# Patient Record
Sex: MALE | Race: WHITE | URBAN - NONMETROPOLITAN AREA
[De-identification: names, ages, dates, MRNs, and addresses within clinical notes are randomized per-mention and may not be internally consistent; named-entity substitution may affect disease eponyms.]

---

## 2024-05-22 ENCOUNTER — OFFICE VISIT (OUTPATIENT)
Dept: UROLOGY | Facility: CLINIC | Age: 69
End: 2024-05-22
Payer: MEDICARE

## 2024-05-22 VITALS — BODY MASS INDEX: 26.41 KG/M2 | HEIGHT: 72 IN | WEIGHT: 195 LBS | RESPIRATION RATE: 16 BRPM

## 2024-05-22 DIAGNOSIS — R35.1 NOCTURIA: ICD-10-CM

## 2024-05-22 DIAGNOSIS — N40.1 BENIGN PROSTATIC HYPERPLASIA WITH LOWER URINARY TRACT SYMPTOMS, SYMPTOM DETAILS UNSPECIFIED: ICD-10-CM

## 2024-05-22 DIAGNOSIS — N52.9 ERECTILE DYSFUNCTION, UNSPECIFIED ERECTILE DYSFUNCTION TYPE: ICD-10-CM

## 2024-05-22 LAB
POC APPEARANCE, URINE: CLEAR
POC BILIRUBIN, URINE: NEGATIVE
POC BLOOD, URINE: NEGATIVE
POC COLOR, URINE: YELLOW
POC GLUCOSE, URINE: NEGATIVE MG/DL
POC KETONES, URINE: NEGATIVE MG/DL
POC LEUKOCYTES, URINE: NEGATIVE
POC NITRITE,URINE: NEGATIVE
POC PH, URINE: 6 PH
POC PROTEIN, URINE: NEGATIVE MG/DL
POC SPECIFIC GRAVITY, URINE: 1.02
POC UROBILINOGEN, URINE: 0.2 EU/DL

## 2024-05-22 PROCEDURE — 1036F TOBACCO NON-USER: CPT | Performed by: UROLOGY

## 2024-05-22 PROCEDURE — 81003 URINALYSIS AUTO W/O SCOPE: CPT | Performed by: UROLOGY

## 2024-05-22 PROCEDURE — 99204 OFFICE O/P NEW MOD 45 MIN: CPT | Performed by: UROLOGY

## 2024-05-22 RX ORDER — ASPIRIN 81 MG/1
81 TABLET ORAL 2 TIMES DAILY
COMMUNITY
Start: 2022-10-26

## 2024-05-22 RX ORDER — SILDENAFIL CITRATE 20 MG/1
20 TABLET ORAL
COMMUNITY
Start: 2024-04-18

## 2024-05-22 RX ORDER — MONTELUKAST SODIUM 10 MG/1
10 TABLET ORAL
COMMUNITY
Start: 2024-04-18

## 2024-05-22 RX ORDER — HYDROCHLOROTHIAZIDE 25 MG/1
25 TABLET ORAL
COMMUNITY
Start: 2024-04-18

## 2024-05-22 RX ORDER — POTASSIUM CHLORIDE 20 MEQ/1
20 TABLET, EXTENDED RELEASE ORAL 2 TIMES DAILY
COMMUNITY
Start: 2024-04-18

## 2024-05-22 RX ORDER — TAMSULOSIN HYDROCHLORIDE 0.4 MG/1
2 CAPSULE ORAL NIGHTLY
COMMUNITY
Start: 2024-04-18

## 2024-05-22 RX ORDER — ALFUZOSIN HYDROCHLORIDE 10 MG/1
10 TABLET, EXTENDED RELEASE ORAL DAILY
Qty: 30 TABLET | Refills: 11 | Status: SHIPPED | OUTPATIENT
Start: 2024-05-22 | End: 2025-05-22

## 2024-05-22 RX ORDER — LOSARTAN POTASSIUM AND HYDROCHLOROTHIAZIDE 25; 100 MG/1; MG/1
1 TABLET ORAL
COMMUNITY
Start: 2024-04-18

## 2024-05-22 RX ORDER — ATORVASTATIN CALCIUM 40 MG/1
1 TABLET, FILM COATED ORAL NIGHTLY
COMMUNITY
Start: 2024-04-18

## 2024-05-22 RX ORDER — FAMOTIDINE 20 MG/1
1 TABLET, FILM COATED ORAL AS NEEDED
COMMUNITY

## 2024-05-22 ASSESSMENT — ENCOUNTER SYMPTOMS
EYES NEGATIVE: 1
COUGH: 0
PSYCHIATRIC NEGATIVE: 1
ALLERGIC/IMMUNOLOGIC NEGATIVE: 1
SHORTNESS OF BREATH: 0
FEVER: 0
CHILLS: 0
NAUSEA: 0
DIFFICULTY URINATING: 0
ENDOCRINE NEGATIVE: 1

## 2024-05-22 NOTE — PROGRESS NOTES
Subjective   Patient ID: Wilfredo Fish is a 69 y.o. male.    HPI  Patient is here to establish for BPH. He states he has a split stream. He states when he sits down he does not have this issue but otherwise at night spraying and weak stream is an issue. . Denies hesitancy or weak stream.  Denies dysuria. Denies hematuria. Nocturia x2-3. He is taking Flomax. He has failed daily Cialis due to side effects. Most recent PSA was 1.18 on 4/24. No hx of kidney stones.  ED is chronic. Sildenafil PRN.  UA clear 5/24      Review of Systems   Constitutional:  Negative for chills and fever.   HENT: Negative.     Eyes: Negative.    Respiratory:  Negative for cough and shortness of breath.    Cardiovascular:  Negative for chest pain and leg swelling.   Gastrointestinal:  Negative for nausea.   Endocrine: Negative.    Genitourinary:  Negative for difficulty urinating.        Negative except for documented in HPI   Allergic/Immunologic: Negative.    Neurological:         Alert & oriented X 3   Hematological:         Denies blood thinners   Psychiatric/Behavioral: Negative.         Objective   Physical Exam  Vitals and nursing note reviewed.   Constitutional:       General: He is not in acute distress.     Appearance: Normal appearance.   Pulmonary:      Effort: Pulmonary effort is normal.   Abdominal:      Tenderness: There is no abdominal tenderness.   Genitourinary:     Comments: Kidneys non palpable bilaterally  Bladder non palpable or tender  Scrotum no mass, No hydrocele  Epididymis- No spermatocele. Non Tender.  Testicles: No mass. WNL  Urethra: No discharge. Mild hypospadias  Penis within normal limits... No lesions  Prostate - symmetric, no nodules. BENIGN  Seminal Vesicals: No mass.  Sphincter tone: normal  Neurological:      Mental Status: He is alert.         Assessment/Plan   Diagnoses and all orders for this visit:  Benign prostatic hyperplasia with lower urinary tract symptoms, symptom details  unspecified  Nocturia  -     POCT UA Automated manually resulted  Erectile dysfunction, unspecified erectile dysfunction type      All available PSA values reviewed, Options discussed. Questions answered.   Diet changes for prostate health discussed and educational information given. Pros/Cons of prostate health supplements discussed.   Treatment options for LUTS reviewed  Hold Flomax  Uroxatrol Rx given  Discussed DDAVP for Nocturia 2-3x  Cysto scheduled to evaluate for stricture  Prostate U/S ordered-Discussed UroLift  Discussed timed voiding. Discussed fluid and caffeine intake  Treatment options for ED reviewed.  Continue Sildenafil PRN  Lifestyle change to help prevent UTIs discussed. Encouraged fluid intake.    F/U  cysto and prostate U/S

## 2024-05-29 ENCOUNTER — APPOINTMENT (OUTPATIENT)
Dept: UROLOGY | Facility: CLINIC | Age: 69
End: 2024-05-29
Payer: MEDICARE

## 2024-06-06 NOTE — PROGRESS NOTES
Patient ID: Wilfredo Fish is a 69 y.o. male.    Procedures  The patient was prepped using a Betadine solution. Lidocaine jelly was instilled into the urethra. The flexible cystoscope was sterilely inserted into the urethra and formal cystoscopy performed in a systematic fashion. . For detailed findings of the procedure, please see Dr. Moreau remarks below   CIPRO 250MG POBID TIMES 3 DAYS GIVEN    The patient was placed on his side. Lidocaine jelly was injected into the anus. We inserted the ultrasound probe into the rectum. The prostate was identified and the ultrasound performed.. For details of the ultrasound findings, please see the remarks section of this note. The patient tolerated the procedure well. There were no complications. He was instructed on post procedure care.     PSA 1.18 (4/2024)    PROSTATE VOLUME-34CC    NO MASS. NO MEDIAN LOBE. MILD TRABECULATION    DISCUSSED UROLIFT.

## 2024-06-19 ENCOUNTER — APPOINTMENT (OUTPATIENT)
Dept: UROLOGY | Facility: CLINIC | Age: 69
End: 2024-06-19
Payer: MEDICARE

## 2024-06-19 DIAGNOSIS — N40.1 BENIGN PROSTATIC HYPERPLASIA WITH LOWER URINARY TRACT SYMPTOMS, SYMPTOM DETAILS UNSPECIFIED: ICD-10-CM

## 2024-06-19 DIAGNOSIS — R35.1 NOCTURIA: ICD-10-CM

## 2024-09-13 ASSESSMENT — ENCOUNTER SYMPTOMS
ALLERGIC/IMMUNOLOGIC NEGATIVE: 1
NAUSEA: 0
ENDOCRINE NEGATIVE: 1
COUGH: 0
PSYCHIATRIC NEGATIVE: 1
EYES NEGATIVE: 1
FEVER: 0
DIFFICULTY URINATING: 0
CHILLS: 0
SHORTNESS OF BREATH: 0

## 2024-09-13 NOTE — PROGRESS NOTES
Subjective   Patient ID: Wilfredo Fish is a 69 y.o. male.    HPI  Patient is here for 3 month follow up. Chronic BPH sx are mild and stable. Denies urgency and frequency. Denies dysuria. Denies hematuria. Nocturia x1. He was taking Flomax but stopped. He has failed Daily Cialis. Cysto and Prostate US done on 6/24 and showed that he was a UroLift candidate. PSA 4/24 was 1.18. UA was clear 5/24.      Review of Systems   Constitutional:  Negative for chills and fever.   HENT: Negative.     Eyes: Negative.    Respiratory:  Negative for cough and shortness of breath.    Cardiovascular:  Negative for chest pain and leg swelling.   Gastrointestinal:  Negative for nausea.   Endocrine: Negative.    Genitourinary:  Negative for difficulty urinating.        Negative except for documented in HPI   Allergic/Immunologic: Negative.    Neurological:         Alert & oriented X 3   Hematological:         Denies blood thinners   Psychiatric/Behavioral: Negative.         Objective   Physical Exam  Vitals and nursing note reviewed.   Pulmonary:      Effort: Pulmonary effort is normal.   Abdominal:      Palpations: Abdomen is soft.      Tenderness: There is no abdominal tenderness.   Genitourinary:     Comments: Kidneys non palpable bilaterally  Bladder non palpable or tender  Neurological:      Mental Status: He is alert.         Assessment/Plan   Diagnoses and all orders for this visit:  Erectile dysfunction, unspecified erectile dysfunction type  Nocturia  Benign prostatic hyperplasia with lower urinary tract symptoms, symptom details unspecified    All available PSA values reviewed, Options discussed. Questions answered.   Diet changes for prostate health discussed and educational information given. Pros/Cons of prostate health supplements discussed.   Treatment options for LUTS reviewed  Pros/cons of UroLift discussed  Discussed timed voiding. Discussed fluid and caffeine intake  Treatment options for ED reviewed.  Lifestyle  change to help prevent UTIs discussed. Encouraged fluid intake.    F/U 4/25 with PSA

## 2024-09-18 ENCOUNTER — APPOINTMENT (OUTPATIENT)
Dept: UROLOGY | Facility: CLINIC | Age: 69
End: 2024-09-18
Payer: MEDICARE

## 2024-09-18 VITALS — BODY MASS INDEX: 26.04 KG/M2 | RESPIRATION RATE: 16 BRPM | WEIGHT: 192 LBS

## 2024-09-18 DIAGNOSIS — N52.9 ERECTILE DYSFUNCTION, UNSPECIFIED ERECTILE DYSFUNCTION TYPE: ICD-10-CM

## 2024-09-18 DIAGNOSIS — R35.1 NOCTURIA: ICD-10-CM

## 2024-09-18 DIAGNOSIS — N40.1 BENIGN PROSTATIC HYPERPLASIA WITH LOWER URINARY TRACT SYMPTOMS, SYMPTOM DETAILS UNSPECIFIED: ICD-10-CM

## 2024-09-18 PROCEDURE — 1036F TOBACCO NON-USER: CPT | Performed by: UROLOGY

## 2024-09-18 PROCEDURE — 99213 OFFICE O/P EST LOW 20 MIN: CPT | Performed by: UROLOGY

## 2024-09-18 PROCEDURE — 1159F MED LIST DOCD IN RCRD: CPT | Performed by: UROLOGY

## 2024-10-03 ENCOUNTER — PREP FOR PROCEDURE (OUTPATIENT)
Dept: UROLOGY | Facility: CLINIC | Age: 69
End: 2024-10-03
Payer: MEDICARE

## 2024-10-03 DIAGNOSIS — N13.8 BENIGN LOCALIZED HYPERPLASIA OF PROSTATE WITH URINARY OBSTRUCTION: ICD-10-CM

## 2024-10-03 DIAGNOSIS — N40.1 BENIGN LOCALIZED HYPERPLASIA OF PROSTATE WITH URINARY OBSTRUCTION: ICD-10-CM

## 2024-10-03 RX ORDER — SODIUM CHLORIDE, SODIUM LACTATE, POTASSIUM CHLORIDE, CALCIUM CHLORIDE 600; 310; 30; 20 MG/100ML; MG/100ML; MG/100ML; MG/100ML
20 INJECTION, SOLUTION INTRAVENOUS CONTINUOUS
OUTPATIENT
Start: 2024-10-03

## 2024-10-30 ENCOUNTER — APPOINTMENT (OUTPATIENT)
Dept: UROLOGY | Facility: CLINIC | Age: 69
End: 2024-10-30
Payer: MEDICARE

## 2024-11-13 NOTE — PREPROCEDURE INSTRUCTIONS
No outpatient medications have been marked as taking for the 11/19/24 encounter (Hospital Encounter).                       NPO Instructions:    Do not eat any food after midnight the night before your surgery/procedure.  You may have clear liquids until TWO hours before surgery/procedure. This includes water, black tea/coffee, (no milk or cream) apple juice and electrolyte drinks (Gatorade).    Additional Instructions:     Will need  home, will receive call day before surgery with arrival time

## 2024-11-18 ENCOUNTER — ANESTHESIA EVENT (OUTPATIENT)
Dept: OPERATING ROOM | Facility: HOSPITAL | Age: 69
End: 2024-11-18
Payer: MEDICARE

## 2024-11-19 ENCOUNTER — HOSPITAL ENCOUNTER (OUTPATIENT)
Facility: HOSPITAL | Age: 69
Setting detail: OUTPATIENT SURGERY
Discharge: HOME | End: 2024-11-19
Attending: UROLOGY | Admitting: UROLOGY
Payer: MEDICARE

## 2024-11-19 ENCOUNTER — ANESTHESIA (OUTPATIENT)
Dept: OPERATING ROOM | Facility: HOSPITAL | Age: 69
End: 2024-11-19
Payer: MEDICARE

## 2024-11-19 VITALS
OXYGEN SATURATION: 93 % | RESPIRATION RATE: 18 BRPM | WEIGHT: 193.12 LBS | HEART RATE: 74 BPM | BODY MASS INDEX: 26.16 KG/M2 | SYSTOLIC BLOOD PRESSURE: 136 MMHG | DIASTOLIC BLOOD PRESSURE: 78 MMHG | TEMPERATURE: 96.7 F | HEIGHT: 72 IN

## 2024-11-19 DIAGNOSIS — N40.1 BENIGN PROSTATIC HYPERPLASIA WITH URINARY FREQUENCY: Primary | ICD-10-CM

## 2024-11-19 DIAGNOSIS — N13.8 BENIGN LOCALIZED HYPERPLASIA OF PROSTATE WITH URINARY OBSTRUCTION: ICD-10-CM

## 2024-11-19 DIAGNOSIS — N40.1 BENIGN LOCALIZED HYPERPLASIA OF PROSTATE WITH URINARY OBSTRUCTION: ICD-10-CM

## 2024-11-19 DIAGNOSIS — R35.0 BENIGN PROSTATIC HYPERPLASIA WITH URINARY FREQUENCY: Primary | ICD-10-CM

## 2024-11-19 PROBLEM — I10 HTN (HYPERTENSION): Status: ACTIVE | Noted: 2024-11-19

## 2024-11-19 PROBLEM — K21.9 GASTROESOPHAGEAL REFLUX DISEASE: Status: ACTIVE | Noted: 2024-11-19

## 2024-11-19 PROBLEM — I77.9 CAROTID ARTERY DISEASE (CMS-HCC): Status: ACTIVE | Noted: 2024-11-19

## 2024-11-19 PROCEDURE — 0753T DGTZ GLS MCRSCP SLD LEVEL IV: CPT | Mod: TC,SAMLAB | Performed by: UROLOGY

## 2024-11-19 PROCEDURE — 3700000002 HC GENERAL ANESTHESIA TIME - EACH INCREMENTAL 1 MINUTE: Performed by: UROLOGY

## 2024-11-19 PROCEDURE — 52601 PROSTATECTOMY (TURP): CPT | Performed by: UROLOGY

## 2024-11-19 PROCEDURE — 2500000004 HC RX 250 GENERAL PHARMACY W/ HCPCS (ALT 636 FOR OP/ED): Performed by: ANESTHESIOLOGY

## 2024-11-19 PROCEDURE — 3600000008 HC OR TIME - EACH INCREMENTAL 1 MINUTE - PROCEDURE LEVEL THREE: Performed by: UROLOGY

## 2024-11-19 PROCEDURE — 2500000004 HC RX 250 GENERAL PHARMACY W/ HCPCS (ALT 636 FOR OP/ED): Performed by: UROLOGY

## 2024-11-19 PROCEDURE — 7100000002 HC RECOVERY ROOM TIME - EACH INCREMENTAL 1 MINUTE: Performed by: UROLOGY

## 2024-11-19 PROCEDURE — 7100000010 HC PHASE TWO TIME - EACH INCREMENTAL 1 MINUTE: Performed by: UROLOGY

## 2024-11-19 PROCEDURE — 88305 TISSUE EXAM BY PATHOLOGIST: CPT | Performed by: PATHOLOGY

## 2024-11-19 PROCEDURE — 7100000009 HC PHASE TWO TIME - INITIAL BASE CHARGE: Performed by: UROLOGY

## 2024-11-19 PROCEDURE — 2500000001 HC RX 250 WO HCPCS SELF ADMINISTERED DRUGS (ALT 637 FOR MEDICARE OP): Performed by: ANESTHESIOLOGY

## 2024-11-19 PROCEDURE — 7100000001 HC RECOVERY ROOM TIME - INITIAL BASE CHARGE: Performed by: UROLOGY

## 2024-11-19 PROCEDURE — 3700000001 HC GENERAL ANESTHESIA TIME - INITIAL BASE CHARGE: Performed by: UROLOGY

## 2024-11-19 PROCEDURE — 2500000005 HC RX 250 GENERAL PHARMACY W/O HCPCS: Performed by: ANESTHESIOLOGY

## 2024-11-19 PROCEDURE — 2720000007 HC OR 272 NO HCPCS: Performed by: UROLOGY

## 2024-11-19 PROCEDURE — 3600000003 HC OR TIME - INITIAL BASE CHARGE - PROCEDURE LEVEL THREE: Performed by: UROLOGY

## 2024-11-19 PROCEDURE — 51702 INSERT TEMP BLADDER CATH: CPT

## 2024-11-19 RX ORDER — LABETALOL HYDROCHLORIDE 5 MG/ML
5 INJECTION, SOLUTION INTRAVENOUS ONCE AS NEEDED
Status: DISCONTINUED | OUTPATIENT
Start: 2024-11-19 | End: 2024-11-19 | Stop reason: HOSPADM

## 2024-11-19 RX ORDER — ACETAMINOPHEN 325 MG/1
975 TABLET ORAL ONCE
Status: COMPLETED | OUTPATIENT
Start: 2024-11-19 | End: 2024-11-19

## 2024-11-19 RX ORDER — SODIUM CHLORIDE, SODIUM LACTATE, POTASSIUM CHLORIDE, CALCIUM CHLORIDE 600; 310; 30; 20 MG/100ML; MG/100ML; MG/100ML; MG/100ML
20 INJECTION, SOLUTION INTRAVENOUS CONTINUOUS
Status: DISCONTINUED | OUTPATIENT
Start: 2024-11-19 | End: 2024-11-19 | Stop reason: HOSPADM

## 2024-11-19 RX ORDER — CIPROFLOXACIN 500 MG/1
250 TABLET ORAL 2 TIMES DAILY
Qty: 3 TABLET | Refills: 0 | Status: SHIPPED | OUTPATIENT
Start: 2024-11-19 | End: 2024-11-22

## 2024-11-19 RX ORDER — DEXAMETHASONE SODIUM PHOSPHATE 10 MG/ML
8 INJECTION INTRAMUSCULAR; INTRAVENOUS ONCE
Status: COMPLETED | OUTPATIENT
Start: 2024-11-19 | End: 2024-11-19

## 2024-11-19 RX ORDER — ONDANSETRON HYDROCHLORIDE 2 MG/ML
4 INJECTION, SOLUTION INTRAVENOUS ONCE AS NEEDED
Status: DISCONTINUED | OUTPATIENT
Start: 2024-11-19 | End: 2024-11-19 | Stop reason: HOSPADM

## 2024-11-19 RX ORDER — OXYCODONE HYDROCHLORIDE 5 MG/1
5 TABLET ORAL EVERY 4 HOURS PRN
Status: DISCONTINUED | OUTPATIENT
Start: 2024-11-19 | End: 2024-11-19 | Stop reason: HOSPADM

## 2024-11-19 RX ORDER — FENTANYL CITRATE 50 UG/ML
INJECTION, SOLUTION INTRAMUSCULAR; INTRAVENOUS AS NEEDED
Status: DISCONTINUED | OUTPATIENT
Start: 2024-11-19 | End: 2024-11-19

## 2024-11-19 RX ORDER — MIDAZOLAM HYDROCHLORIDE 1 MG/ML
1 INJECTION INTRAMUSCULAR; INTRAVENOUS ONCE
Status: COMPLETED | OUTPATIENT
Start: 2024-11-19 | End: 2024-11-19

## 2024-11-19 RX ORDER — HYDROCODONE BITARTRATE AND ACETAMINOPHEN 5; 325 MG/1; MG/1
1 TABLET ORAL EVERY 6 HOURS PRN
Qty: 20 TABLET | Refills: 0 | Status: SHIPPED | OUTPATIENT
Start: 2024-11-19

## 2024-11-19 RX ORDER — PHENAZOPYRIDINE HYDROCHLORIDE 200 MG/1
200 TABLET, FILM COATED ORAL 3 TIMES DAILY PRN
Qty: 30 TABLET | Refills: 0 | Status: SHIPPED | OUTPATIENT
Start: 2024-11-19

## 2024-11-19 RX ORDER — SODIUM CHLORIDE, SODIUM LACTATE, POTASSIUM CHLORIDE, CALCIUM CHLORIDE 600; 310; 30; 20 MG/100ML; MG/100ML; MG/100ML; MG/100ML
100 INJECTION, SOLUTION INTRAVENOUS CONTINUOUS
Status: DISCONTINUED | OUTPATIENT
Start: 2024-11-19 | End: 2024-11-19 | Stop reason: HOSPADM

## 2024-11-19 RX ORDER — MORPHINE SULFATE 2 MG/ML
2 INJECTION, SOLUTION INTRAMUSCULAR; INTRAVENOUS EVERY 5 MIN PRN
Status: DISCONTINUED | OUTPATIENT
Start: 2024-11-19 | End: 2024-11-19 | Stop reason: HOSPADM

## 2024-11-19 RX ORDER — ONDANSETRON HYDROCHLORIDE 2 MG/ML
4 INJECTION, SOLUTION INTRAVENOUS ONCE
Status: COMPLETED | OUTPATIENT
Start: 2024-11-19 | End: 2024-11-19

## 2024-11-19 RX ORDER — PROPOFOL 10 MG/ML
INJECTION, EMULSION INTRAVENOUS AS NEEDED
Status: DISCONTINUED | OUTPATIENT
Start: 2024-11-19 | End: 2024-11-19

## 2024-11-19 RX ORDER — MORPHINE SULFATE 4 MG/ML
4 INJECTION, SOLUTION INTRAMUSCULAR; INTRAVENOUS EVERY 5 MIN PRN
Status: DISCONTINUED | OUTPATIENT
Start: 2024-11-19 | End: 2024-11-19 | Stop reason: HOSPADM

## 2024-11-19 ASSESSMENT — PAIN SCALES - GENERAL
PAINLEVEL_OUTOF10: 0 - NO PAIN

## 2024-11-19 ASSESSMENT — PAIN - FUNCTIONAL ASSESSMENT
PAIN_FUNCTIONAL_ASSESSMENT: 0-10
PAIN_FUNCTIONAL_ASSESSMENT: 0-10

## 2024-11-19 ASSESSMENT — ENCOUNTER SYMPTOMS
CHILLS: 0
NAUSEA: 0
ENDOCRINE NEGATIVE: 1
DIFFICULTY URINATING: 0
FEVER: 0
EYES NEGATIVE: 1
PSYCHIATRIC NEGATIVE: 1
COUGH: 0
ALLERGIC/IMMUNOLOGIC NEGATIVE: 1
SHORTNESS OF BREATH: 0

## 2024-11-19 ASSESSMENT — COLUMBIA-SUICIDE SEVERITY RATING SCALE - C-SSRS
2. HAVE YOU ACTUALLY HAD ANY THOUGHTS OF KILLING YOURSELF?: NO
1. IN THE PAST MONTH, HAVE YOU WISHED YOU WERE DEAD OR WISHED YOU COULD GO TO SLEEP AND NOT WAKE UP?: NO
6. HAVE YOU EVER DONE ANYTHING, STARTED TO DO ANYTHING, OR PREPARED TO DO ANYTHING TO END YOUR LIFE?: NO

## 2024-11-19 NOTE — ANESTHESIA POSTPROCEDURE EVALUATION
Patient: Wilfredo Fish    Procedure Summary       Date: 11/19/24 Room / Location: [facility] OR 01 / Virtual [facility] OR    Anesthesia Start: 1044 Anesthesia Stop: 1117    Procedure: Resection Transurethral Prostate Diagnosis:       Benign localized hyperplasia of prostate with urinary obstruction      (Benign localized hyperplasia of prostate with urinary obstruction [N40.1, N13.8])    Surgeons: Navneet Moreau MD Responsible Provider: Jaspreet Mckeon MD    Anesthesia Type: general ASA Status: 2            Anesthesia Type: general    Vitals Value Taken Time   /82 11/19/24 1135   Temp 36.1 °C (97 °F) 11/19/24 1117   Pulse 71 11/19/24 1135   Resp 13 11/19/24 1135   SpO2 95 % 11/19/24 1135       Anesthesia Post Evaluation    Patient location during evaluation: bedside  Patient participation: complete - patient participated  Level of consciousness: sleepy but conscious  Pain management: adequate  Airway patency: patent  Cardiovascular status: acceptable  Respiratory status: acceptable  Hydration status: acceptable  Postoperative Nausea and Vomiting: none    No notable events documented.

## 2024-11-19 NOTE — H&P
History Of Present Illness  Wilfredo Fish is a 69 y.o. male presenting with BPH with LUTS.     Past Medical History  Past Medical History:   Diagnosis Date    BPH (benign prostatic hyperplasia)     GERD (gastroesophageal reflux disease)     Hyperlipidemia     Hypertension     TIA (transient ischemic attack)     no deficits       Surgical History  Past Surgical History:   Procedure Laterality Date    LIPOMA RESECTION      SINUS SURGERY      TOTAL KNEE ARTHROPLASTY N/A         Social History  He reports that he has never smoked. He has never used smokeless tobacco. He reports current alcohol use. He reports that he does not use drugs.    Family History  No family history on file.     Allergies  Patient has no known allergies.    Review of Systems   Constitutional:  Negative for chills and fever.   HENT: Negative.     Eyes: Negative.    Respiratory:  Negative for cough and shortness of breath.    Cardiovascular:  Negative for chest pain and leg swelling.   Gastrointestinal:  Negative for nausea.   Endocrine: Negative.    Genitourinary:  Negative for difficulty urinating.        Negative except for documented in HPI   Allergic/Immunologic: Negative.    Neurological:         Alert & oriented X 3   Hematological:         Denies blood thinners   Psychiatric/Behavioral: Negative.          Physical Exam  Vitals and nursing note reviewed.   Pulmonary:      Effort: Pulmonary effort is normal.   Abdominal:      Palpations: Abdomen is soft.      Tenderness: There is no abdominal tenderness.   Genitourinary:     Comments: Kidneys non palpable bilaterally  Bladder non palpable or tender  Neurological:      Mental Status: He is alert.          Last Recorded Vitals  Weight 85.3 kg (188 lb).         Assessment/Plan   Assessment & Plan  Benign localized hyperplasia of prostate with urinary obstruction    HTN (hypertension)    Carotid artery disease (CMS-[facility])    Gastroesophageal reflux disease            Navneet Moreau MD

## 2024-11-19 NOTE — ANESTHESIA PREPROCEDURE EVALUATION
Patient: Wilfredo Fish    Procedure Information       Date/Time: 11/19/24 1030    Procedure: Resection Transurethral Prostate    Location: [facility] OR 01 / [facility] OR    Surgeons: Navneet Moreau MD            Relevant Problems   Anesthesia (within normal limits)      Cardiac   (+) HTN (hypertension)      Neuro   (+) Carotid artery disease (CMS-HCC)      GI   (+) Gastroesophageal reflux disease      /Renal   (+) Benign localized hyperplasia of prostate with urinary obstruction   (+) Benign prostatic hyperplasia with lower urinary tract symptoms       Clinical information reviewed:   Tobacco  Allergies  Meds   Med Hx  Surg Hx   Fam Hx  Soc Hx        NPO Detail:  No data recorded     Physical Exam    Airway  Mallampati: II  TM distance: >3 FB  Neck ROM: full     Cardiovascular   Rhythm: regular  Rate: normal     Dental    Pulmonary    Abdominal          Anesthesia Plan    History of general anesthesia?: yes  History of complications of general anesthesia?: no    ASA 2     general     intravenous induction   Anesthetic plan and risks discussed with patient.

## 2024-11-19 NOTE — OP NOTE
Resection Transurethral Prostate Operative Note     Date: 2024  OR Location: [facility] OR    Name: Wilfredo Fish, : 1955, Age: 69 y.o., MRN: [id number], Sex: male    Diagnosis  Pre-op Diagnosis      * Benign localized hyperplasia of prostate with urinary obstruction [N40.1, N13.8] Post-op Diagnosis     * Benign localized hyperplasia of prostate with urinary obstruction [N40.1, N13.8]     Procedures  Resection Transurethral Prostate  40279 - CA TRURL ELECTROSURG RESCJ PROSTATE BLEED COMPLETE      Surgeons      * Navneet Moreau - Primary    Resident/Fellow/Other Assistant:  Surgeons and Role:  * No surgeons found with a matching role *    Staff:   Circulator: Guadalupe Gaytan Person: Berta    Anesthesia Staff: Anesthesiologist: Jaspreet Mckeon MD    Procedure Summary  Anesthesia: General  ASA: II  Estimated Blood Loss: 0mL  Intra-op Medications:   Administrations occurring from 1030 to 1100 on 24:   Medication Name Total Dose   ePHEDrine injection 20 mg   fentaNYL (Sublimaze) injection 50 mcg/mL 100 mcg   ketamine injection 50 mg/ 5 mL (10 mg/mL) 20 mg   propofol (Diprivan) injection 10 mg/mL 100 mg              Anesthesia Record               Intraprocedure I/O Totals       None           Specimen:   ID Type Source Tests Collected by Time   1 : PROSTATE PIECES Tissue PROSTATE TURP SURGICAL PATHOLOGY EXAM Navneet Moreau MD 2024 1058                 Drains and/or Catheters:   Urethral Catheter Latex 22 Fr. (Active)       Tourniquet Times:           Indications: Wilfredo Fish is an 69 y.o. male who is having surgery for Benign localized hyperplasia of prostate with urinary obstruction [N40.1, N13.8].     The patient was seen in the preoperative area. The risks, benefits, complications, treatment options, non-operative alternatives, expected recovery and outcomes were discussed with the patient. The possibilities of reaction to medication, pulmonary aspiration, injury to surrounding structures,  bleeding, recurrent infection, the need for additional procedures, failure to diagnose a condition, and creating a complication requiring transfusion or operation were discussed with the patient. The patient concurred with the proposed plan, giving informed consent.  The site of surgery was properly noted/marked if necessary per policy. The patient has been actively warmed in preoperative area.   Anesthetic:  General.    Estimated blood loss:  Minimal.    Complications:  None.    Pre-Op Diagnosis:  obstructive urinary symptoms.    Post-Op Diagnosis:  obstructive urinary symptoms.    Operation:  Cystoscopy.  Transurethral resection using the PLASMA loop.    INDICATIONS AND CONSENT: Patient presents with obstructive urinary symptoms who has failed medical management who now presents for treatment.  After the risks, benefits, alternatives and indications for the procedure were explained, he consented.     PROCEDURE:  The patient was brought to the operating room and placed on the table in supine position.  After adequate anesthesia was obtained, the patient was prepped and draped in the standard surgical fashion.  First the continuous flow #21 Sami cystoscope was inserted in the bladder.  The landmarks were identified, including both ureteral orifices and the verumontanum.  We then began Treatment/resection of the prostate using the PLASMA loop.  We started first at the bladder neck and then treated the left lobe, followed by the right lobe.  Care was taken so as not to treat distal to the verumontanum.  After both lateral lobes had been treated some anterior tissue was treated as well. Resected specimen was removed and sent to pathology. At this point the procedure was terminated.      The patient tolerated the procedure well.  There were no complications.  Three-way Cancino catheter was inserted without complications.     Attending Attestation: I was present and scrubbed for the entire procedure.    Navneet WATSON  [place]  Phone Number: [phone]

## 2024-11-19 NOTE — ANESTHESIA PROCEDURE NOTES
Airway  Date/Time: 11/19/2024 10:48 AM  Urgency: elective      Staffing  Performed: MAYO   Authorized by: Jaspreet Mckeon MD    Performed by: Jaspreet Mckeon MD  Patient location during procedure: OR    Indications and Patient Condition  Indications for airway management: anesthesia      Final Airway Details  Final airway type: supraglottic airway      Successful airway: Size 5

## 2024-11-20 ASSESSMENT — ENCOUNTER SYMPTOMS
FEVER: 0
PSYCHIATRIC NEGATIVE: 1
ENDOCRINE NEGATIVE: 1
COUGH: 0
DIFFICULTY URINATING: 0
SHORTNESS OF BREATH: 0
EYES NEGATIVE: 1
CHILLS: 0
NAUSEA: 0
ALLERGIC/IMMUNOLOGIC NEGATIVE: 1

## 2024-11-20 NOTE — PROGRESS NOTES
Subjective   Patient ID: Wilfredo Fish is a 69 y.o. male.    HPI  Patient is here for cath removal. S/P Plasma TURP on 11/19/2024.  PSA 4/24 was 1.18       Review of Systems   Constitutional:  Negative for chills and fever.   HENT: Negative.     Eyes: Negative.    Respiratory:  Negative for cough and shortness of breath.    Cardiovascular:  Negative for chest pain and leg swelling.   Gastrointestinal:  Negative for nausea.   Endocrine: Negative.    Genitourinary:  Negative for difficulty urinating.        Negative except for documented in HPI   Allergic/Immunologic: Negative.    Neurological:         Alert & oriented X 3   Hematological:         Denies blood thinners   Psychiatric/Behavioral: Negative.         Objective   Physical Exam  Genitourinary:     Comments: Cancino draining clear urine        Assessment/Plan   Diagnoses and all orders for this visit:  Benign prostatic hyperplasia with lower urinary tract symptoms, symptom details unspecified    Cancino removed. Path pending  Has F/U scheduled

## 2024-11-21 ENCOUNTER — OFFICE VISIT (OUTPATIENT)
Dept: UROLOGY | Facility: CLINIC | Age: 69
End: 2024-11-21
Payer: MEDICARE

## 2024-11-21 VITALS
HEART RATE: 59 BPM | SYSTOLIC BLOOD PRESSURE: 123 MMHG | DIASTOLIC BLOOD PRESSURE: 70 MMHG | HEIGHT: 72 IN | BODY MASS INDEX: 26.82 KG/M2 | WEIGHT: 198 LBS

## 2024-11-21 DIAGNOSIS — N40.1 BENIGN PROSTATIC HYPERPLASIA WITH LOWER URINARY TRACT SYMPTOMS, SYMPTOM DETAILS UNSPECIFIED: Primary | ICD-10-CM

## 2024-11-21 PROCEDURE — 1036F TOBACCO NON-USER: CPT | Performed by: UROLOGY

## 2024-11-21 PROCEDURE — 3008F BODY MASS INDEX DOCD: CPT | Performed by: UROLOGY

## 2024-11-21 PROCEDURE — 3078F DIAST BP <80 MM HG: CPT | Performed by: UROLOGY

## 2024-11-21 PROCEDURE — 3074F SYST BP LT 130 MM HG: CPT | Performed by: UROLOGY

## 2024-11-21 PROCEDURE — 1159F MED LIST DOCD IN RCRD: CPT | Performed by: UROLOGY

## 2024-11-21 PROCEDURE — 99024 POSTOP FOLLOW-UP VISIT: CPT | Performed by: UROLOGY

## 2024-11-21 RX ORDER — FLUOROURACIL 5 MG/G
1 CREAM TOPICAL
COMMUNITY
Start: 2024-11-15

## 2024-12-02 LAB
LABORATORY COMMENT REPORT: NORMAL
PATH REPORT.FINAL DX SPEC: NORMAL
PATH REPORT.GROSS SPEC: NORMAL
PATH REPORT.RELEVANT HX SPEC: NORMAL
PATH REPORT.TOTAL CANCER: NORMAL

## 2025-04-16 ENCOUNTER — APPOINTMENT (OUTPATIENT)
Dept: UROLOGY | Facility: CLINIC | Age: 70
End: 2025-04-16
Payer: MEDICARE

## (undated) DEVICE — SOLUTION, PREP, PVP IODINE, FLIP TOP, 4OZ

## (undated) DEVICE — STRAP, ARMBOARD, 3IN, BLUE/WHITE, SECURE HOOK

## (undated) DEVICE — SOLUTION, IRRIGATION, STERILE WATER, 1000 ML, POUR BOTTLE

## (undated) DEVICE — DRESSING, GAUZE, SPONGE, 12 PLY, CURITY, 4 X 4 IN, RIGID TRAY, STERILE

## (undated) DEVICE — MASK, FACE, ANESTHESIA, ADULT LARGE, INFL PORT, LF

## (undated) DEVICE — CIRCUIT, BREATHING, ADULT, B/V FILTER, HMEF, 3 L BAG, 108 IN

## (undated) DEVICE — PLUG, CATHETER

## (undated) DEVICE — GLOVE, PROTEXIS PI CLASSIC, SZ-8.0, PF, PF, LF

## (undated) DEVICE — BAG, DRAINAGE, ANTI-REFLUX CHAMBER, 2000ML

## (undated) DEVICE — STRAP, KNEE AND BODY, SINGLE USE

## (undated) DEVICE — EVACUATOR, UROVAC

## (undated) DEVICE — SYRINGE, 30 CC, LUER LOCK

## (undated) DEVICE — ELECTRODE, HF-RESECTION, PLASMALOOP, 24FR, 12/16 DEG, STANDARD

## (undated) DEVICE — MAT, QUICK WICK, FLUID ABSORPTION, 40X30

## (undated) DEVICE — TUBING, SUCTION, NON-CONDUCTIVE, FEMALE, 6.4MM X 10, STERILE

## (undated) DEVICE — Device

## (undated) DEVICE — SOLUTION, IRRIGATION, USP, SODIUM CHLORIDE 0.9%, 3000 ML, BAG

## (undated) DEVICE — CATHETER, URETHRAL, FOLEY, 3 WAY, 30CC, 22FR